# Patient Record
Sex: MALE | Race: WHITE | Employment: FULL TIME | URBAN - METROPOLITAN AREA
[De-identification: names, ages, dates, MRNs, and addresses within clinical notes are randomized per-mention and may not be internally consistent; named-entity substitution may affect disease eponyms.]

---

## 2020-06-29 ENCOUNTER — HOSPITAL ENCOUNTER (OUTPATIENT)
Age: 45
Discharge: HOME | End: 2020-07-01
Payer: COMMERCIAL

## 2020-06-29 ENCOUNTER — HOSPITAL ENCOUNTER (OUTPATIENT)
Dept: GENERAL RADIOLOGY | Age: 45
Discharge: HOME | End: 2020-07-01
Payer: COMMERCIAL

## 2020-06-29 PROCEDURE — 73630 X-RAY EXAM OF FOOT: CPT

## 2024-03-12 ENCOUNTER — OFFICE VISIT (OUTPATIENT)
Dept: PRIMARY CARE CLINIC | Age: 49
End: 2024-03-12
Payer: COMMERCIAL

## 2024-03-12 VITALS
HEART RATE: 54 BPM | OXYGEN SATURATION: 97 % | WEIGHT: 218.6 LBS | SYSTOLIC BLOOD PRESSURE: 138 MMHG | DIASTOLIC BLOOD PRESSURE: 80 MMHG | BODY MASS INDEX: 31.37 KG/M2

## 2024-03-12 DIAGNOSIS — Z76.89 ENCOUNTER TO ESTABLISH CARE WITH NEW DOCTOR: Primary | ICD-10-CM

## 2024-03-12 DIAGNOSIS — Z12.11 COLON CANCER SCREENING: ICD-10-CM

## 2024-03-12 DIAGNOSIS — M25.512 ACUTE PAIN OF LEFT SHOULDER: ICD-10-CM

## 2024-03-12 DIAGNOSIS — G89.29 CHRONIC RIGHT SHOULDER PAIN: ICD-10-CM

## 2024-03-12 DIAGNOSIS — R03.0 ELEVATED BLOOD PRESSURE READING WITHOUT DIAGNOSIS OF HYPERTENSION: ICD-10-CM

## 2024-03-12 DIAGNOSIS — R33.9 INCOMPLETE EMPTYING OF BLADDER: ICD-10-CM

## 2024-03-12 DIAGNOSIS — F10.10 ALCOHOL ABUSE: ICD-10-CM

## 2024-03-12 DIAGNOSIS — M25.561 ACUTE PAIN OF RIGHT KNEE: ICD-10-CM

## 2024-03-12 DIAGNOSIS — M25.511 CHRONIC RIGHT SHOULDER PAIN: ICD-10-CM

## 2024-03-12 DIAGNOSIS — I44.0 HEART BLOCK, FIRST DEGREE: ICD-10-CM

## 2024-03-12 DIAGNOSIS — Z13.220 LIPID SCREENING: ICD-10-CM

## 2024-03-12 PROCEDURE — 99204 OFFICE O/P NEW MOD 45 MIN: CPT | Performed by: STUDENT IN AN ORGANIZED HEALTH CARE EDUCATION/TRAINING PROGRAM

## 2024-03-12 SDOH — ECONOMIC STABILITY: FOOD INSECURITY: WITHIN THE PAST 12 MONTHS, YOU WORRIED THAT YOUR FOOD WOULD RUN OUT BEFORE YOU GOT MONEY TO BUY MORE.: NEVER TRUE

## 2024-03-12 SDOH — ECONOMIC STABILITY: HOUSING INSECURITY
IN THE LAST 12 MONTHS, WAS THERE A TIME WHEN YOU DID NOT HAVE A STEADY PLACE TO SLEEP OR SLEPT IN A SHELTER (INCLUDING NOW)?: NO

## 2024-03-12 SDOH — ECONOMIC STABILITY: INCOME INSECURITY: HOW HARD IS IT FOR YOU TO PAY FOR THE VERY BASICS LIKE FOOD, HOUSING, MEDICAL CARE, AND HEATING?: NOT HARD AT ALL

## 2024-03-12 SDOH — ECONOMIC STABILITY: FOOD INSECURITY: WITHIN THE PAST 12 MONTHS, THE FOOD YOU BOUGHT JUST DIDN'T LAST AND YOU DIDN'T HAVE MONEY TO GET MORE.: NEVER TRUE

## 2024-03-12 ASSESSMENT — PATIENT HEALTH QUESTIONNAIRE - PHQ9
2. FEELING DOWN, DEPRESSED OR HOPELESS: 0
SUM OF ALL RESPONSES TO PHQ9 QUESTIONS 1 & 2: 0
SUM OF ALL RESPONSES TO PHQ QUESTIONS 1-9: 0
1. LITTLE INTEREST OR PLEASURE IN DOING THINGS: 0
SUM OF ALL RESPONSES TO PHQ QUESTIONS 1-9: 0

## 2024-03-12 ASSESSMENT — ENCOUNTER SYMPTOMS
SHORTNESS OF BREATH: 0
ABDOMINAL PAIN: 0
NAUSEA: 0
VOMITING: 0

## 2024-03-12 NOTE — PROGRESS NOTES
Status:   Future     Standing Expiration Date:   3/12/2025    PSA Screening     Standing Status:   Future     Standing Expiration Date:   3/12/2025     No orders of the defined types were placed in this encounter.      Discussed use, benefit, and side effects of prescribed medications. All patient questions answered. Pt voiced understanding. Reviewed health maintenance. Patient agreed with treatment plan. Follow up as directed.     Total time of 53 minutes was spent on patient in office, today.    Electronically signed by Eldon Geronimo DO on 3/12/2024 at 4:27 PM

## 2024-03-15 ENCOUNTER — HOSPITAL ENCOUNTER (OUTPATIENT)
Age: 49
Setting detail: SPECIMEN
Discharge: HOME | End: 2024-03-15

## 2024-03-15 DIAGNOSIS — Z13.220 LIPID SCREENING: ICD-10-CM

## 2024-03-15 DIAGNOSIS — Z76.89 ENCOUNTER TO ESTABLISH CARE WITH NEW DOCTOR: ICD-10-CM

## 2024-03-15 DIAGNOSIS — R33.9 INCOMPLETE EMPTYING OF BLADDER: ICD-10-CM

## 2024-03-15 DIAGNOSIS — R03.0 ELEVATED BLOOD PRESSURE READING WITHOUT DIAGNOSIS OF HYPERTENSION: ICD-10-CM

## 2024-03-15 LAB
ALBUMIN SERPL-MCNC: 4.5 G/DL (ref 3.5–5.2)
ALBUMIN/GLOB SERPL: 2 {RATIO} (ref 1–2.5)
ALP SERPL-CCNC: 51 U/L (ref 40–129)
ALT SERPL-CCNC: 15 U/L (ref 10–50)
ANION GAP SERPL CALCULATED.3IONS-SCNC: 12 MMOL/L (ref 9–16)
AST SERPL-CCNC: 27 U/L (ref 10–50)
BASOPHILS # BLD: <0.03 K/UL (ref 0–0.2)
BASOPHILS NFR BLD: 0 % (ref 0–2)
BILIRUB SERPL-MCNC: 0.8 MG/DL (ref 0–1.2)
BUN SERPL-MCNC: 13 MG/DL (ref 6–20)
CALCIUM SERPL-MCNC: 9.3 MG/DL (ref 8.6–10.4)
CHLORIDE SERPL-SCNC: 102 MMOL/L (ref 98–107)
CHOLEST SERPL-MCNC: 146 MG/DL (ref 0–199)
CHOLESTEROL/HDL RATIO: 2
CO2 SERPL-SCNC: 23 MMOL/L (ref 20–31)
CREAT SERPL-MCNC: 0.8 MG/DL (ref 0.7–1.2)
EOSINOPHIL # BLD: 0.15 K/UL (ref 0–0.44)
EOSINOPHILS RELATIVE PERCENT: 3 % (ref 1–4)
ERYTHROCYTE [DISTWIDTH] IN BLOOD BY AUTOMATED COUNT: 12.8 % (ref 11.8–14.4)
GFR SERPL CREATININE-BSD FRML MDRD: >60 ML/MIN/1.73M2
GLUCOSE SERPL-MCNC: 73 MG/DL (ref 74–99)
HCT VFR BLD AUTO: 44 % (ref 40.7–50.3)
HDLC SERPL-MCNC: 83 MG/DL
HGB BLD-MCNC: 14.4 G/DL (ref 13–17)
IMM GRANULOCYTES # BLD AUTO: <0.03 K/UL (ref 0–0.3)
IMM GRANULOCYTES NFR BLD: 0 %
LDLC SERPL CALC-MCNC: 57 MG/DL (ref 0–100)
LYMPHOCYTES NFR BLD: 1.79 K/UL (ref 1.1–3.7)
LYMPHOCYTES RELATIVE PERCENT: 32 % (ref 24–43)
MCH RBC QN AUTO: 31 PG (ref 25.2–33.5)
MCHC RBC AUTO-ENTMCNC: 32.7 G/DL (ref 28.4–34.8)
MCV RBC AUTO: 94.8 FL (ref 82.6–102.9)
MONOCYTES NFR BLD: 0.59 K/UL (ref 0.1–1.2)
MONOCYTES NFR BLD: 11 % (ref 3–12)
NEUTROPHILS NFR BLD: 54 % (ref 36–65)
NEUTS SEG NFR BLD: 2.99 K/UL (ref 1.5–8.1)
NRBC BLD-RTO: 0 PER 100 WBC
PLATELET # BLD AUTO: 170 K/UL (ref 138–453)
PMV BLD AUTO: 10.6 FL (ref 8.1–13.5)
POTASSIUM SERPL-SCNC: 4 MMOL/L (ref 3.7–5.3)
PROT SERPL-MCNC: 7.1 G/DL (ref 6.6–8.7)
PSA SERPL-MCNC: 0.5 NG/ML (ref 0–4)
RBC # BLD AUTO: 4.64 M/UL (ref 4.21–5.77)
SODIUM SERPL-SCNC: 137 MMOL/L (ref 136–145)
TRIGL SERPL-MCNC: 32 MG/DL
TSH SERPL DL<=0.05 MIU/L-ACNC: 1.89 UIU/ML (ref 0.27–4.2)
VLDLC SERPL CALC-MCNC: 6 MG/DL
WBC OTHER # BLD: 5.5 K/UL (ref 3.5–11.3)

## 2024-04-06 LAB — NONINV COLON CA DNA+OCC BLD SCRN STL QL: NEGATIVE

## 2024-04-11 PROBLEM — Z12.11 COLON CANCER SCREENING: Status: RESOLVED | Noted: 2024-03-12 | Resolved: 2024-04-11

## 2025-06-12 NOTE — PROGRESS NOTES
[facility]  [facility]  [address] [address]  Dept: [phone]    Charles Dickey is a 50 y.o. male who presents today for his medical conditions/complaints as noted below.      Chief Complaint   Patient presents with    New Patient     New to provider - asking for referral to cardiologist       HPI:     Patient is new to provider.  Saw Dr. Geronimo x 1 in March, 2024.  Patient's past medical, surgical, family and social history was reviewed.  Medication list reconciled.  Cholesterol favorable in March, 2024.  Had an elevated blood pressure reading and plan was to monitor-appears he used to take hydrochlorothiazide.  Reports he is nervous to be here today, typically has higher BP readings in doctors office.   Has not been checking his home BP readings recently.  Does have a home cuff that he can start taking readings on.  Has been tired after eating recently. Trying to cut out carbs.   He has a history of first-degree heart block, saw cardiology in the distant past.  Has had some stress at work recently and has started to notice occasional heart palpitations/racing sensation. Has been happening for a few weeks. Occurring randomly.   No associated CP, passing out, no visual changes, some lightheadedness with the episodes.   Also reports that he will occasionally wake up from nap/sleep gasping for breath, thinks that he might snore.  Would be interested in a sleep study preferably a home sleep study, but would like to deal with his palpitations before pursuing this.    Had negative cologuard done 3/2024.     Patient Care Team:  Lulú Marquez PA-C as PCP - General (Family Medicine)  Eldon Geronimo DO as PCP - Empaneled Provider    No results found for: \"LABA1C\", \"[id number]\"  Lab Results   Component Value Date    CHOL 146 03/15/2024     Lab Results   Component Value Date    TRIG 32 03/15/2024     Lab Results   Component Value Date    HDL 83 03/15/2024

## 2025-06-16 ENCOUNTER — OFFICE VISIT (OUTPATIENT)
Dept: PRIMARY CARE CLINIC | Age: 50
End: 2025-06-16
Payer: COMMERCIAL

## 2025-06-16 VITALS
OXYGEN SATURATION: 98 % | BODY MASS INDEX: 31.35 KG/M2 | DIASTOLIC BLOOD PRESSURE: 92 MMHG | SYSTOLIC BLOOD PRESSURE: 136 MMHG | HEIGHT: 70 IN | HEART RATE: 67 BPM | WEIGHT: 219 LBS

## 2025-06-16 DIAGNOSIS — Z12.5 SCREENING FOR PROSTATE CANCER: ICD-10-CM

## 2025-06-16 DIAGNOSIS — I10 PRIMARY HYPERTENSION: ICD-10-CM

## 2025-06-16 DIAGNOSIS — Z76.89 ENCOUNTER TO ESTABLISH CARE: Primary | ICD-10-CM

## 2025-06-16 DIAGNOSIS — R00.2 PALPITATIONS: ICD-10-CM

## 2025-06-16 DIAGNOSIS — I44.0 HEART BLOCK, FIRST DEGREE: ICD-10-CM

## 2025-06-16 PROBLEM — R03.0 ELEVATED BLOOD PRESSURE READING WITHOUT DIAGNOSIS OF HYPERTENSION: Status: RESOLVED | Noted: 2024-03-12 | Resolved: 2025-06-16

## 2025-06-16 PROCEDURE — 99214 OFFICE O/P EST MOD 30 MIN: CPT | Performed by: PHYSICIAN ASSISTANT

## 2025-06-16 PROCEDURE — 3017F COLORECTAL CA SCREEN DOC REV: CPT | Performed by: PHYSICIAN ASSISTANT

## 2025-06-16 PROCEDURE — G8419 CALC BMI OUT NRM PARAM NOF/U: HCPCS | Performed by: PHYSICIAN ASSISTANT

## 2025-06-16 PROCEDURE — 3075F SYST BP GE 130 - 139MM HG: CPT | Performed by: PHYSICIAN ASSISTANT

## 2025-06-16 PROCEDURE — 3080F DIAST BP >= 90 MM HG: CPT | Performed by: PHYSICIAN ASSISTANT

## 2025-06-16 PROCEDURE — G8427 DOCREV CUR MEDS BY ELIG CLIN: HCPCS | Performed by: PHYSICIAN ASSISTANT

## 2025-06-16 PROCEDURE — 1036F TOBACCO NON-USER: CPT | Performed by: PHYSICIAN ASSISTANT

## 2025-06-16 SDOH — ECONOMIC STABILITY: FOOD INSECURITY: WITHIN THE PAST 12 MONTHS, YOU WORRIED THAT YOUR FOOD WOULD RUN OUT BEFORE YOU GOT MONEY TO BUY MORE.: NEVER TRUE

## 2025-06-16 SDOH — ECONOMIC STABILITY: FOOD INSECURITY: WITHIN THE PAST 12 MONTHS, THE FOOD YOU BOUGHT JUST DIDN'T LAST AND YOU DIDN'T HAVE MONEY TO GET MORE.: NEVER TRUE

## 2025-06-16 ASSESSMENT — ENCOUNTER SYMPTOMS
SHORTNESS OF BREATH: 0
COUGH: 0
WHEEZING: 0
VOMITING: 0
NAUSEA: 0
ABDOMINAL PAIN: 0

## 2025-06-16 ASSESSMENT — PATIENT HEALTH QUESTIONNAIRE - PHQ9
SUM OF ALL RESPONSES TO PHQ QUESTIONS 1-9: 0
2. FEELING DOWN, DEPRESSED OR HOPELESS: NOT AT ALL
1. LITTLE INTEREST OR PLEASURE IN DOING THINGS: NOT AT ALL
SUM OF ALL RESPONSES TO PHQ QUESTIONS 1-9: 0

## 2025-06-19 DIAGNOSIS — Z76.89 ENCOUNTER TO ESTABLISH CARE: ICD-10-CM

## 2025-06-19 DIAGNOSIS — R00.2 PALPITATIONS: ICD-10-CM

## 2025-06-19 DIAGNOSIS — Z12.5 SCREENING FOR PROSTATE CANCER: ICD-10-CM

## 2025-06-19 DIAGNOSIS — I10 PRIMARY HYPERTENSION: ICD-10-CM

## 2025-06-19 LAB
ALBUMIN/GLOBULIN RATIO: 1.7 (ref 1–2.5)
ALBUMIN: 4.7 G/DL (ref 3.5–5.2)
ALP BLD-CCNC: 61 U/L (ref 40–129)
ALT SERPL-CCNC: 24 U/L (ref 10–50)
ANION GAP SERPL CALCULATED.3IONS-SCNC: 10 MMOL/L (ref 9–16)
AST SERPL-CCNC: 25 U/L (ref 10–50)
BASOPHILS ABSOLUTE: <0.03 K/UL (ref 0–0.2)
BASOPHILS RELATIVE PERCENT: 0 % (ref 0–2)
BILIRUB SERPL-MCNC: 0.9 MG/DL (ref 0–1.2)
BUN BLDV-MCNC: 15 MG/DL (ref 6–20)
CALCIUM SERPL-MCNC: 9.3 MG/DL (ref 8.6–10.4)
CHLORIDE BLD-SCNC: 104 MMOL/L (ref 98–107)
CO2: 26 MMOL/L (ref 20–31)
CREAT SERPL-MCNC: 0.9 MG/DL (ref 0.7–1.2)
EOSINOPHILS ABSOLUTE: 0.24 K/UL (ref 0–0.44)
EOSINOPHILS RELATIVE PERCENT: 4 % (ref 1–4)
ESTIMATED AVERAGE GLUCOSE: 94 MG/DL
GFR, ESTIMATED: >90 ML/MIN/1.73M2
GLUCOSE BLD-MCNC: 87 MG/DL (ref 74–99)
HBA1C MFR BLD: 4.9 % (ref 4–6)
HCT VFR BLD CALC: 45.3 % (ref 40.7–50.3)
HEMOGLOBIN: 15.3 G/DL (ref 13–17)
IMMATURE GRANULOCYTES %: 0 %
IMMATURE GRANULOCYTES ABSOLUTE: <0.03 K/UL (ref 0–0.3)
LYMPHOCYTES ABSOLUTE: 2.08 K/UL (ref 1.1–3.7)
LYMPHOCYTES RELATIVE PERCENT: 37 % (ref 24–43)
MCH RBC QN AUTO: 31.2 PG (ref 25.2–33.5)
MCHC RBC AUTO-ENTMCNC: 33.8 G/DL (ref 28.4–34.8)
MCV RBC AUTO: 92.4 FL (ref 82.6–102.9)
MONOCYTES ABSOLUTE: 0.71 K/UL (ref 0.1–1.2)
MONOCYTES RELATIVE PERCENT: 13 % (ref 3–12)
NEUTROPHILS ABSOLUTE: 2.63 K/UL (ref 1.5–8.1)
NEUTROPHILS RELATIVE PERCENT: 46 % (ref 36–65)
NRBC AUTOMATED: 0 PER 100 WBC
PDW BLD-RTO: 12.7 % (ref 11.8–14.4)
PLATELET # BLD: 178 K/UL (ref 138–453)
PMV BLD AUTO: 10.7 FL (ref 8.1–13.5)
POTASSIUM SERPL-SCNC: 4.2 MMOL/L (ref 3.7–5.3)
PROSTATE SPECIFIC ANTIGEN: 0.66 NG/ML (ref 0–4)
RBC # BLD: 4.9 M/UL (ref 4.21–5.77)
SODIUM BLD-SCNC: 140 MMOL/L (ref 136–145)
TOTAL PROTEIN: 7.5 G/DL (ref 6.6–8.7)
TSH SERPL DL<=0.05 MIU/L-ACNC: 1.91 UIU/ML (ref 0.27–4.2)
WBC # BLD: 5.7 K/UL (ref 3.5–11.3)

## 2025-06-20 ENCOUNTER — RESULTS FOLLOW-UP (OUTPATIENT)
Dept: PRIMARY CARE CLINIC | Age: 50
End: 2025-06-20

## 2025-06-20 ENCOUNTER — HOSPITAL ENCOUNTER (OUTPATIENT)
Age: 50
Discharge: HOME | End: 2025-06-22
Payer: COMMERCIAL

## 2025-06-20 DIAGNOSIS — R00.2 PALPITATIONS: ICD-10-CM

## 2025-06-20 DIAGNOSIS — I44.0 HEART BLOCK, FIRST DEGREE: ICD-10-CM

## 2025-06-20 LAB
EKG ATRIAL RATE: 72 BPM
EKG P AXIS: 40 DEGREES
EKG P-R INTERVAL: 332 MS
EKG Q-T INTERVAL: 394 MS
EKG QRS DURATION: 110 MS
EKG QTC CALCULATION (BAZETT): 431 MS
EKG R AXIS: -16 DEGREES
EKG T AXIS: 16 DEGREES
EKG VENTRICULAR RATE: 72 BPM

## 2025-06-20 PROCEDURE — 93005 ELECTROCARDIOGRAM TRACING: CPT

## 2025-06-20 PROCEDURE — 93010 ELECTROCARDIOGRAM REPORT: CPT | Performed by: INTERNAL MEDICINE

## 2025-06-20 NOTE — RESULT ENCOUNTER NOTE
Please notify patient that their lab results are unremarkable.    Have a great day,  Matthew Marquez PA-C

## 2025-06-20 NOTE — RESULT ENCOUNTER NOTE
Received EKG result; first-degree AV block re demonstrated.  Septal infarct that is indeterminate, this is somewhat nonspecific but I would not be opposed to proceeding with cardiac stress testing for further evaluation; if patient is agreeable I will order this.    Thanks,    Matthew

## 2025-06-25 ENCOUNTER — HOSPITAL ENCOUNTER (OUTPATIENT)
Age: 50
Discharge: HOME | End: 2025-06-27
Payer: COMMERCIAL

## 2025-06-25 DIAGNOSIS — I44.0 HEART BLOCK, FIRST DEGREE: ICD-10-CM

## 2025-06-25 DIAGNOSIS — R00.2 PALPITATIONS: ICD-10-CM

## 2025-06-25 PROCEDURE — 93225 XTRNL ECG REC<48 HRS REC: CPT

## 2025-07-10 PROBLEM — R06.83 SNORING: Status: ACTIVE | Noted: 2025-07-10

## 2025-07-10 NOTE — PROGRESS NOTES
[facility]  [facility]  [address]  [place] [address]  Dept: [phone]    Charles Dickey is a 50 y.o. male who presents today for his medical conditions/complaints as noted below.      Chief Complaint   Patient presents with    Follow-up     Patient is here for a x1 month f/u - questionnaires given  Vaccines declined - NECK IS 16 INCHES        HPI:     Patient presents to the office today for follow-up.  History of elevated blood pressure readings at office visits-plan was to log home blood pressure readings and bring to appointment today for review.  BP readings: He has been logging his home blood pressures and pulse.  Readings scanned into his chart.  EKG Holter monitor was done for history of palpitations and first-degree AV block. His Holter monitor showed 1st degree AV block, second degree AV block and advanced 3:1 AV black once during monitoring.  States that he pressed the button at first because he thought he should because of a red light, did not have symptoms.  He has been referred to cardiology for further evaluation and management.  Has not been dizzy, no passing out episodes.   Works out at the gym, has been more fatigued than is normal for him. He has been taking it easier at the gym.   He continues to occasionally have heart fluttering.     He does not snore or wake up gasping.  He is not particular interested in proceeding with sleep study at this time.    Pertinent HPI from last office visit:  \"Saw Dr. Geronimo x 1 in March, 2024.  Cholesterol favorable in March, 2024.  Had an elevated blood pressure reading and plan was to monitor-appears he used to take hydrochlorothiazide.  Reports he is nervous to be here today, typically has higher BP readings in doctors office.   Has not been checking his home BP readings recently.  Does have a home cuff that he can start taking readings on.  Has been tired after eating recently. Trying to cut out carbs.   He has

## 2025-07-18 ENCOUNTER — OFFICE VISIT (OUTPATIENT)
Dept: PRIMARY CARE CLINIC | Age: 50
End: 2025-07-18
Payer: COMMERCIAL

## 2025-07-18 VITALS
OXYGEN SATURATION: 98 % | BODY MASS INDEX: 32.07 KG/M2 | SYSTOLIC BLOOD PRESSURE: 132 MMHG | DIASTOLIC BLOOD PRESSURE: 88 MMHG | HEIGHT: 70 IN | HEART RATE: 58 BPM | WEIGHT: 224 LBS

## 2025-07-18 DIAGNOSIS — I44.0 HEART BLOCK, FIRST DEGREE: ICD-10-CM

## 2025-07-18 DIAGNOSIS — I10 PRIMARY HYPERTENSION: Primary | ICD-10-CM

## 2025-07-18 DIAGNOSIS — R00.2 PALPITATIONS: ICD-10-CM

## 2025-07-18 PROBLEM — R06.83 SNORING: Status: RESOLVED | Noted: 2025-07-10 | Resolved: 2025-07-18

## 2025-07-18 PROCEDURE — 3017F COLORECTAL CA SCREEN DOC REV: CPT | Performed by: PHYSICIAN ASSISTANT

## 2025-07-18 PROCEDURE — G8417 CALC BMI ABV UP PARAM F/U: HCPCS | Performed by: PHYSICIAN ASSISTANT

## 2025-07-18 PROCEDURE — 99213 OFFICE O/P EST LOW 20 MIN: CPT | Performed by: PHYSICIAN ASSISTANT

## 2025-07-18 PROCEDURE — G8427 DOCREV CUR MEDS BY ELIG CLIN: HCPCS | Performed by: PHYSICIAN ASSISTANT

## 2025-07-18 PROCEDURE — 3075F SYST BP GE 130 - 139MM HG: CPT | Performed by: PHYSICIAN ASSISTANT

## 2025-07-18 PROCEDURE — 1036F TOBACCO NON-USER: CPT | Performed by: PHYSICIAN ASSISTANT

## 2025-07-18 PROCEDURE — 3079F DIAST BP 80-89 MM HG: CPT | Performed by: PHYSICIAN ASSISTANT
